# Patient Record
Sex: MALE | Race: WHITE | NOT HISPANIC OR LATINO | URBAN - METROPOLITAN AREA
[De-identification: names, ages, dates, MRNs, and addresses within clinical notes are randomized per-mention and may not be internally consistent; named-entity substitution may affect disease eponyms.]

---

## 2023-03-29 ENCOUNTER — OFFICE (OUTPATIENT)
Dept: URBAN - METROPOLITAN AREA CLINIC 79 | Facility: CLINIC | Age: 58
End: 2023-03-29

## 2023-03-29 VITALS
HEART RATE: 84 BPM | WEIGHT: 200 LBS | TEMPERATURE: 96.9 F | HEIGHT: 70 IN | SYSTOLIC BLOOD PRESSURE: 154 MMHG | DIASTOLIC BLOOD PRESSURE: 103 MMHG

## 2023-03-29 DIAGNOSIS — K21.9 GASTRO-ESOPHAGEAL REFLUX DISEASE WITHOUT ESOPHAGITIS: ICD-10-CM

## 2023-03-29 PROCEDURE — 99204 OFFICE O/P NEW MOD 45 MIN: CPT | Performed by: PHYSICIAN ASSISTANT

## 2023-03-29 NOTE — SERVICEHPINOTES
MELQUIADES HUITRON   is a   58   year old male who is being seen in consultation at the request of   MESFIN REINOSO   for acid reflux. Used to take Prilosec in his 30s for a time, but stopped at some point and did well for years without reflux issues. In the past year, he's had more heartburn and reflux issues. He had COVID (mild case, prior to recurrent reflux sx) and his mother passed away and says he had a bad year. 
br
bceky He started back on Prilosec OTC because if he doesn't take it, he has significant symptoms, including constant heartburn and also reflux/regurg at night. Doesn't seem to matter what he eats. With once daily Prilosec OTC, he feels well. No dysphagia, abdominal pain, black stools. No recent EGD - had one 20+ years ago.br
becky He lost about 80 pounds intentionally about 10 years ago. He stays active to maintain his weight. becky pena He had diverticulitis and needed surgery due to abscess in 2015. He says he had 18" of his colon removed. Since then he's had soft stool once a day. For the past year (since he's been on Prilosec),  he's had a second looser BM later in the morning.
becky pena He has a routine colonoscopy scheduled in [place], though might end up having this with us.becky

## 2023-04-04 ENCOUNTER — OFFICE (OUTPATIENT)
Dept: URBAN - METROPOLITAN AREA CLINIC 30 | Facility: CLINIC | Age: 58
End: 2023-04-04

## 2023-04-04 VITALS
HEART RATE: 88 BPM | DIASTOLIC BLOOD PRESSURE: 798 MMHG | DIASTOLIC BLOOD PRESSURE: 88 MMHG | HEIGHT: 70 IN | DIASTOLIC BLOOD PRESSURE: 93 MMHG | OXYGEN SATURATION: 99 % | HEART RATE: 81 BPM | TEMPERATURE: 97.8 F | DIASTOLIC BLOOD PRESSURE: 94 MMHG | SYSTOLIC BLOOD PRESSURE: 140 MMHG | HEART RATE: 80 BPM | OXYGEN SATURATION: 96 % | WEIGHT: 200 LBS | SYSTOLIC BLOOD PRESSURE: 118 MMHG | SYSTOLIC BLOOD PRESSURE: 138 MMHG | OXYGEN SATURATION: 93 % | OXYGEN SATURATION: 94 % | DIASTOLIC BLOOD PRESSURE: 86 MMHG | SYSTOLIC BLOOD PRESSURE: 135 MMHG | RESPIRATION RATE: 20 BRPM | SYSTOLIC BLOOD PRESSURE: 129 MMHG | TEMPERATURE: 98.6 F | RESPIRATION RATE: 16 BRPM | HEART RATE: 86 BPM

## 2023-04-04 DIAGNOSIS — K21.9 GASTRO-ESOPHAGEAL REFLUX DISEASE WITHOUT ESOPHAGITIS: ICD-10-CM
